# Patient Record
Sex: MALE | Race: BLACK OR AFRICAN AMERICAN | NOT HISPANIC OR LATINO | Employment: UNEMPLOYED | URBAN - METROPOLITAN AREA
[De-identification: names, ages, dates, MRNs, and addresses within clinical notes are randomized per-mention and may not be internally consistent; named-entity substitution may affect disease eponyms.]

---

## 2022-01-06 ENCOUNTER — HISTORICAL (OUTPATIENT)
Dept: ADMINISTRATIVE | Facility: HOSPITAL | Age: 37
End: 2022-01-06

## 2022-01-12 ENCOUNTER — HISTORICAL (OUTPATIENT)
Dept: SURGERY | Facility: HOSPITAL | Age: 37
End: 2022-01-12

## 2022-01-12 LAB — SARS-COV-2 AG RESP QL IA.RAPID: POSITIVE

## 2022-02-24 ENCOUNTER — HISTORICAL (OUTPATIENT)
Dept: ADMINISTRATIVE | Facility: HOSPITAL | Age: 37
End: 2022-02-24

## 2022-02-24 LAB
BUN SERPL-MCNC: 8.1 MG/DL (ref 8.9–20.6)
CALCIUM SERPL-MCNC: 9.3 MG/DL (ref 8.7–10.5)
CHLORIDE SERPL-SCNC: 104 MMOL/L (ref 98–107)
CO2 SERPL-SCNC: 26 MMOL/L (ref 22–29)
CREAT SERPL-MCNC: 1.15 MG/DL (ref 0.73–1.18)
CREAT/UREA NIT SERPL: 7
GLUCOSE SERPL-MCNC: 87 MG/DL (ref 74–100)
HEMOLYSIS INTERF INDEX SERPL-ACNC: 4
ICTERIC INTERF INDEX SERPL-ACNC: 1
LIPEMIC INTERF INDEX SERPL-ACNC: 6
POTASSIUM SERPL-SCNC: 4.4 MMOL/L (ref 3.5–5.1)
SODIUM SERPL-SCNC: 137 MMOL/L (ref 136–145)

## 2022-02-25 ENCOUNTER — HISTORICAL (OUTPATIENT)
Dept: SURGERY | Facility: HOSPITAL | Age: 37
End: 2022-02-25

## 2022-02-25 LAB
ABS NEUT (OLG): 2.15 (ref 2.1–9.2)
ANISOCYTOSIS BLD QL SMEAR: NORMAL
BASOPHILS NFR BLD MANUAL: 2 %
EOSINOPHIL NFR BLD MANUAL: 2 %
ERYTHROCYTE [DISTWIDTH] IN BLOOD BY AUTOMATED COUNT: 13.3 % (ref 11.5–14.5)
FLAG2 (OHS): 70
FLAG3 (OHS): 80
FLAGS (OHS): 70
GRANULOCYTES NFR BLD MANUAL: 30 % (ref 43–75)
HCT VFR BLD AUTO: 42.6 % (ref 40–51)
HGB BLD-MCNC: 14.3 G/DL (ref 13.5–17.5)
IMM. NE 2 SUSPECT FLAG (OHS): 10
LOW EVENT # SUSPECT FLAG (OHS): 70
LYMPHOCYTES NFR BLD MANUAL: 59 % (ref 20.5–51.1)
MANUAL DIFF? (OHS): YES
MCH RBC QN AUTO: 31.9 PG (ref 26–34)
MCHC RBC AUTO-ENTMCNC: 33.6 G/DL (ref 31–37)
MCV RBC AUTO: 95.1 FL (ref 80–100)
MO BLASTS SUSPECT FLAG (OHS): 70
MONOCYTES NFR BLD MANUAL: 7 % (ref 2–9)
PLATELET # BLD AUTO: 245 10*3/UL (ref 130–400)
PLATELET # BLD EST: ADEQUATE 10*3/UL
PLATELET CLUMPS SUSPECT FLAG (OHS): 60
PMV BLD AUTO: 9.8 FL (ref 7.4–10.4)
RBC # BLD AUTO: 4.48 10*6/UL (ref 4.5–5.9)
WBC # SPEC AUTO: 6 10*3/UL (ref 4.5–11)

## 2022-04-10 ENCOUNTER — HISTORICAL (OUTPATIENT)
Dept: ADMINISTRATIVE | Facility: HOSPITAL | Age: 37
End: 2022-04-10

## 2022-04-29 VITALS
DIASTOLIC BLOOD PRESSURE: 91 MMHG | OXYGEN SATURATION: 97 % | SYSTOLIC BLOOD PRESSURE: 132 MMHG | HEIGHT: 72 IN | WEIGHT: 251.13 LBS | BODY MASS INDEX: 34.01 KG/M2

## 2022-05-14 NOTE — H&P
?is a?37M presenting for?umbilical hernia repair?today. No changes in health since last visit. NPO since midnight. Not on blood thinners. Denies f/c, n/v, CP, SOB. Pt booked, consented for OR today.  ?   Surg hx - spine surgery, R 4th finger surgery  ?   Please see H&P below for full details.  ?   Michael Beasley MD  [facility], PGY-1  ?  Above Hx is reviewed and agree with no obvious change from when seen in Clinic.  ?  Suleiman Hammer MD  ?  ?  Subjective  Mr. Basurto is a 37M presenting for re-scheduling of umbilical hernia surgery. He was scheduled for repair on 1/12 however his surgery had to be cancelled as he tested positive for COVID. Today he is complaining of pain around umbilicus that is unchanged from before and sometimes experiences pain while having a BM. He reports the hernia is easily reducible, however protrudes as soon as he reduces it. Denies any overlying skin changes. No changes in appetite, no n/v. No f/c, CP, SOB, changes in bowel habits.  Objective  ??Vitals & Measurements  ??HR:?62(Peripheral)? RR:?20? BP:?119/81? SpO2:?96%? WT:?114.700?kg? BMI:?33.51?  ??Physical Exam  ??Gen: NAD  CV: RR  Pulm: unlaboured breathing on RA  GI: easily reducible umbilical hernia w ~1cm fascial defect,?tender to palpation; abdomen soft, ND  Ext: no calf?pain  Vasc: 2+ radial, DP pulses BL  Assessment/Plan  ?   Mr. Basurto is a 37M presenting w umbilical hernia. Scheduled for open umbilical hernia repair on 2/25, pt agreeable to surgery. Pt re-consented in clinic.  ?  Michael Beasley MD  [facility] PGY-1  ?  Above noted.  Agree with plan above.  ?  Suleiman Hammer MD

## 2022-05-14 NOTE — H&P
No significant changes in patients healthcare since last office visit documented below. All in agreement to proceed with surgery.  ?   Octaviano Burnett MD  [facility] HO1  ?  ?   Chief Complaint  umbilical hernia referral  History of Present Illness  Tyree is a 36-year-old?incarcerated gentleman?here for evaluation?of a?small umbilical hernia.? He states he first noticed the hernia?2 months ago.? He denies any obstructive symptoms?nausea,?constipation,?diarrhea,?or changes to his stool?but does report?some tenderness?around?the hernia.? He denies any skin changes?and reports that he is able to reduce the hernia but it always?pops back out.? He denies?any significant past medical history?and his?surgical history is only significant for back?and?hand?surgery.  Review of Systems  Review of systems negative except for otherwise noted in HPI  Physical Exam  ??Vitals & Measurements  ??T:?36.8? ?C (Oral)? HR:?74(Peripheral)? RR:?20? BP:?132/91? SpO2:?97%?  ??HT:?182.00?cm? WT:?113.900?kg? BMI:?34.39?  General: NAD, AOx3  HEENT: Normocephalic, atraumatic  Cardiac: Well perfused  Pulm: Equal chest rise bilaterally, breathing comfortably  Abdominal: Soft, nontender, nondistended?small?reducible?umbilical hernia?with approximately 1 cm defect.  Integumentary: Skin clean, dry, and intact  MSK: No edema, 2+ pulses  ?  Assessment/Plan  ?  For now is a 36-year-old male with a small umbilical hernia  ?  Given?small size of the defect?(~1 cm)?patient is an excellent candidate for open repair.?  We will plan?for?repair?next month  Consent obtained in clinic  ?  Lewis Contreras MD  [facility] Surgery, PGY-1  ?  I agree with resident documentation. I was physically present, supervised resident, ?and discussed plan of care.  Paige Ramon MD  Referrals  ?Clinic Follow up, *Est. 01/18/22 3:00:00 CST, Order for future visit, Ventral hernia, [facility] [1]  [1]?Surgery Office Visit Note; Ben DAVILA, Lewis MOORE  12/21/2021 16:15 CST

## 2022-07-11 ENCOUNTER — OFFICE VISIT (OUTPATIENT)
Dept: OTOLARYNGOLOGY | Facility: CLINIC | Age: 37
End: 2022-07-11
Payer: COMMERCIAL

## 2022-07-11 VITALS
HEART RATE: 58 BPM | DIASTOLIC BLOOD PRESSURE: 84 MMHG | WEIGHT: 248 LBS | TEMPERATURE: 99 F | SYSTOLIC BLOOD PRESSURE: 130 MMHG | HEIGHT: 71 IN | BODY MASS INDEX: 34.72 KG/M2

## 2022-07-11 DIAGNOSIS — T16.2XXA FOREIGN BODY OF LEFT EAR, INITIAL ENCOUNTER: ICD-10-CM

## 2022-07-11 DIAGNOSIS — H92.02 OTALGIA, LEFT: Primary | ICD-10-CM

## 2022-07-11 DIAGNOSIS — H91.90 HEARING LOSS, UNSPECIFIED HEARING LOSS TYPE, UNSPECIFIED LATERALITY: ICD-10-CM

## 2022-07-11 PROCEDURE — 99203 OFFICE O/P NEW LOW 30 MIN: CPT | Mod: S$PBB,25,, | Performed by: NURSE PRACTITIONER

## 2022-07-11 PROCEDURE — 69200 CLEAR OUTER EAR CANAL: CPT | Mod: S$PBB,LT,, | Performed by: NURSE PRACTITIONER

## 2022-07-11 PROCEDURE — 99203 PR OFFICE/OUTPT VISIT, NEW, LEVL III, 30-44 MIN: ICD-10-PCS | Mod: S$PBB,25,, | Performed by: NURSE PRACTITIONER

## 2022-07-11 PROCEDURE — 99213 OFFICE O/P EST LOW 20 MIN: CPT | Mod: PBBFAC | Performed by: NURSE PRACTITIONER

## 2022-07-11 PROCEDURE — 69200 PR REMV EXT CANAL FOREIGN BODY: ICD-10-PCS | Mod: S$PBB,LT,, | Performed by: NURSE PRACTITIONER

## 2022-07-11 PROCEDURE — 92504 EAR MICROSCOPY EXAMINATION: CPT | Mod: PBBFAC | Performed by: NURSE PRACTITIONER

## 2022-07-11 PROCEDURE — 69200 CLEAR OUTER EAR CANAL: CPT | Mod: PBBFAC | Performed by: NURSE PRACTITIONER

## 2022-07-11 NOTE — PROGRESS NOTES
"[facility]  Otolaryngology Clinic Note    Jese Basurto  Encounter Date: 7/11/2022  YOB: 1985    Chief Complaint: left otalgia    HPI: 07/11/2022: 37yoM presents with c/o left otalgia x 2mo. This is associated with recent clear otorrhea and fluctuating HL. States he had recurrent infections in that ear as a child but does not recall ever having a PE tube or otologic surgery.  States he actually had some right otalgia initially too which resulted from cleaning his ear with toilet paper. This was flushed out with water at the alf per his report. States a provider at the alf told him there was a growth in his left ear. He last felt ear pain a month ago. Denies nasal congestion or rhinorrhea.     ROS:   10-point review of systems negative except per HPI      Review of patient's allergies indicates:  No Known Allergies    History reviewed. No pertinent past medical history.    Past Surgical History:   Procedure Laterality Date    HERNIA REPAIR         Social History     Socioeconomic History    Marital status: Single   Tobacco Use    Smoking status: Former Smoker    Smokeless tobacco: Never Used   Substance and Sexual Activity    Drug use: Not Currently    Sexual activity: Not Currently       History reviewed. No pertinent family history.    No outpatient encounter medications on file as of 7/11/2022.     No facility-administered encounter medications on file as of 7/11/2022.       Physical Exam:  Vitals:    07/11/22 0816   BP: 130/84   BP Location: Left arm   Patient Position: Sitting   BP Method: Large (Automatic)   Pulse: (!) 58   Temp: 98.5 °F (36.9 °C)   TempSrc: Oral   Weight: 112.5 kg (248 lb)   Height: 5' 11" (1.803 m)       General: NAD, voice normal  Neuro: AAO, CN II - XII grossly intact  Head/ Face: NCAT, symmetric, sensations intact bilaterally  Eyes: EOMI, PERRL  Ears: externally normal with grossly normal hearing  AD: EAC patent, TM intact, no middle ear " effusion, no retractions. Able to autoinsufflate  AS: EAC obstructed with foreign body- this was removed under microscopy with suction and alligator forceps- appears to possibly tip of cotton tipped applicator, TM intact, no middle ear effusion, no retractions. Able to autoinsufflate  Nose: bilateral nares patent, midline septum, no rhinorrhea, no external deformity, +ITH  OC/OP: MMM, no intraoral lesions, no trismus, dentition is good, no uvular deviation, bilaterally symmetric soft palate elevation, palatoglossus and palatopharyngeal fold wnl; tonsils are symmetric and 1+  Indirect laryngoscopy: deferred due to patient intolerance  Neck: soft, supple, no LAD, normal ROM, no thyromegaly  Respiratory: nonlabored, no wheezing, bilateral chest rise  Cardiovascular: RRR  Gastrointestinal: S NT ND  Skin: warm, no lesions  Musculoskeletal: 5/5 strength  Psych: Appropriate affect/mood       Assessment/Plan:  37 y.o. male with left otalgia and HL.  - Audio referral  - Do not place anything in ears  - RTC 4-6 wks     Rosalia Ta NP